# Patient Record
Sex: MALE | Race: WHITE | NOT HISPANIC OR LATINO | Employment: OTHER | URBAN - METROPOLITAN AREA
[De-identification: names, ages, dates, MRNs, and addresses within clinical notes are randomized per-mention and may not be internally consistent; named-entity substitution may affect disease eponyms.]

---

## 2021-01-20 ENCOUNTER — OFFICE VISIT (OUTPATIENT)
Dept: ENDOCRINOLOGY | Facility: CLINIC | Age: 61
End: 2021-01-20

## 2021-01-20 ENCOUNTER — TELEPHONE (OUTPATIENT)
Dept: ENDOCRINOLOGY | Facility: CLINIC | Age: 61
End: 2021-01-20

## 2021-01-20 VITALS
OXYGEN SATURATION: 96 % | WEIGHT: 233.2 LBS | BODY MASS INDEX: 32.65 KG/M2 | TEMPERATURE: 96.4 F | HEIGHT: 71 IN | HEART RATE: 73 BPM | RESPIRATION RATE: 16 BRPM | SYSTOLIC BLOOD PRESSURE: 124 MMHG | DIASTOLIC BLOOD PRESSURE: 80 MMHG

## 2021-01-20 DIAGNOSIS — E11.42 TYPE 2 DIABETES MELLITUS WITH DIABETIC POLYNEUROPATHY, WITHOUT LONG-TERM CURRENT USE OF INSULIN (HCC): ICD-10-CM

## 2021-01-20 DIAGNOSIS — IMO0002 DIABETES MELLITUS TYPE 2, UNCONTROLLED, WITH COMPLICATIONS: Primary | ICD-10-CM

## 2021-01-20 DIAGNOSIS — I10 ESSENTIAL HYPERTENSION: ICD-10-CM

## 2021-01-20 PROBLEM — E11.9 TYPE 2 DIABETES MELLITUS, WITHOUT LONG-TERM CURRENT USE OF INSULIN (HCC): Status: ACTIVE | Noted: 2021-01-20

## 2021-01-20 PROBLEM — E78.00 HYPERCHOLESTEREMIA: Status: ACTIVE | Noted: 2021-01-20

## 2021-01-20 LAB
EXPIRATION DATE: NORMAL
HBA1C MFR BLD: 6.9 %
Lab: NORMAL

## 2021-01-20 PROCEDURE — 83036 HEMOGLOBIN GLYCOSYLATED A1C: CPT | Performed by: INTERNAL MEDICINE

## 2021-01-20 PROCEDURE — 99204 OFFICE O/P NEW MOD 45 MIN: CPT | Performed by: INTERNAL MEDICINE

## 2021-01-20 RX ORDER — LISINOPRIL 10 MG/1
10 TABLET ORAL DAILY
Qty: 90 TABLET | Refills: 3 | Status: SHIPPED | OUTPATIENT
Start: 2021-01-20 | End: 2021-01-20

## 2021-01-20 RX ORDER — HYDROCHLOROTHIAZIDE 25 MG/1
25 TABLET ORAL DAILY
COMMUNITY
Start: 2020-08-31 | End: 2021-01-20 | Stop reason: SINTOL

## 2021-01-20 RX ORDER — LISINOPRIL 10 MG/1
10 TABLET ORAL DAILY
Qty: 90 TABLET | Refills: 3 | Status: SHIPPED | OUTPATIENT
Start: 2021-01-20 | End: 2021-11-23

## 2021-01-20 RX ORDER — GLIPIZIDE 10 MG/1
10 TABLET ORAL
COMMUNITY
End: 2021-01-20 | Stop reason: SDUPTHER

## 2021-01-20 RX ORDER — SERTRALINE HYDROCHLORIDE 100 MG/1
100 TABLET, FILM COATED ORAL DAILY
COMMUNITY
End: 2021-03-08 | Stop reason: SDUPTHER

## 2021-01-20 RX ORDER — LISINOPRIL 10 MG/1
10 TABLET ORAL DAILY
COMMUNITY
End: 2021-01-20 | Stop reason: SDUPTHER

## 2021-01-20 RX ORDER — GLIPIZIDE 10 MG/1
10 TABLET ORAL
Qty: 90 TABLET | Refills: 3 | Status: SHIPPED | OUTPATIENT
Start: 2021-01-20 | End: 2021-07-26

## 2021-01-20 NOTE — ASSESSMENT & PLAN NOTE
He is on a statin but does not recall which. His lipids are due to be checked in July. He will bring the medication in next time.

## 2021-01-20 NOTE — TELEPHONE ENCOUNTER
[facility] called regarding rx for lisinopril. They need clarification on quantity and instructions.

## 2021-03-08 RX ORDER — SERTRALINE HYDROCHLORIDE 100 MG/1
100 TABLET, FILM COATED ORAL DAILY
Qty: 90 TABLET | Refills: 1 | Status: SHIPPED | OUTPATIENT
Start: 2021-03-08 | End: 2021-08-30

## 2021-03-08 RX ORDER — SIMVASTATIN 20 MG
20 TABLET ORAL NIGHTLY
Qty: 90 TABLET | Refills: 1 | Status: SHIPPED | OUTPATIENT
Start: 2021-03-08 | End: 2021-08-30

## 2021-03-08 NOTE — TELEPHONE ENCOUNTER
Pt also needs a refill on simvastatin 20mg. Pt is completely out of medications. Please send to [facility] in [place].

## 2021-07-26 RX ORDER — GLIPIZIDE 10 MG/1
TABLET ORAL
Qty: 180 TABLET | Refills: 0 | Status: SHIPPED | OUTPATIENT
Start: 2021-07-26 | End: 2021-10-13

## 2021-08-30 RX ORDER — SIMVASTATIN 20 MG
20 TABLET ORAL NIGHTLY
Qty: 90 TABLET | Refills: 0 | Status: SHIPPED | OUTPATIENT
Start: 2021-08-30 | End: 2021-11-08

## 2021-08-30 RX ORDER — SERTRALINE HYDROCHLORIDE 100 MG/1
100 TABLET, FILM COATED ORAL DAILY
Qty: 90 TABLET | Refills: 0 | Status: SHIPPED | OUTPATIENT
Start: 2021-08-30 | End: 2021-11-10

## 2021-10-13 RX ORDER — GLIPIZIDE 10 MG/1
TABLET ORAL
Qty: 180 TABLET | Refills: 0 | Status: SHIPPED | OUTPATIENT
Start: 2021-10-13 | End: 2021-11-23 | Stop reason: SDUPTHER

## 2021-11-08 RX ORDER — SIMVASTATIN 20 MG
20 TABLET ORAL NIGHTLY
Qty: 90 TABLET | Refills: 0 | Status: SHIPPED | OUTPATIENT
Start: 2021-11-08 | End: 2021-11-23 | Stop reason: SDUPTHER

## 2021-11-10 RX ORDER — SERTRALINE HYDROCHLORIDE 100 MG/1
100 TABLET, FILM COATED ORAL DAILY
Qty: 90 TABLET | Refills: 0 | Status: SHIPPED | OUTPATIENT
Start: 2021-11-10 | End: 2021-11-23 | Stop reason: SDUPTHER

## 2021-11-23 ENCOUNTER — OFFICE VISIT (OUTPATIENT)
Dept: ENDOCRINOLOGY | Facility: CLINIC | Age: 61
End: 2021-11-23

## 2021-11-23 ENCOUNTER — LAB (OUTPATIENT)
Dept: LAB | Facility: HOSPITAL | Age: 61
End: 2021-11-23

## 2021-11-23 VITALS
BODY MASS INDEX: 32.34 KG/M2 | DIASTOLIC BLOOD PRESSURE: 84 MMHG | OXYGEN SATURATION: 96 % | HEART RATE: 71 BPM | SYSTOLIC BLOOD PRESSURE: 142 MMHG | HEIGHT: 71 IN | WEIGHT: 231 LBS

## 2021-11-23 DIAGNOSIS — E78.2 MIXED HYPERLIPIDEMIA: ICD-10-CM

## 2021-11-23 DIAGNOSIS — I10 ESSENTIAL HYPERTENSION: ICD-10-CM

## 2021-11-23 DIAGNOSIS — E11.65 TYPE 2 DIABETES MELLITUS WITH HYPERGLYCEMIA, WITHOUT LONG-TERM CURRENT USE OF INSULIN (HCC): Primary | ICD-10-CM

## 2021-11-23 DIAGNOSIS — E11.42 TYPE 2 DIABETES MELLITUS WITH DIABETIC POLYNEUROPATHY, WITHOUT LONG-TERM CURRENT USE OF INSULIN (HCC): ICD-10-CM

## 2021-11-23 LAB
EXPIRATION DATE: ABNORMAL
EXPIRATION DATE: NORMAL
GLUCOSE BLDC GLUCOMTR-MCNC: 175 MG/DL (ref 70–130)
HBA1C MFR BLD: 7.8 %
Lab: ABNORMAL
Lab: NORMAL

## 2021-11-23 PROCEDURE — 3051F HG A1C>EQUAL 7.0%<8.0%: CPT | Performed by: PHYSICIAN ASSISTANT

## 2021-11-23 PROCEDURE — 82947 ASSAY GLUCOSE BLOOD QUANT: CPT | Performed by: PHYSICIAN ASSISTANT

## 2021-11-23 PROCEDURE — 83036 HEMOGLOBIN GLYCOSYLATED A1C: CPT | Performed by: PHYSICIAN ASSISTANT

## 2021-11-23 PROCEDURE — 99214 OFFICE O/P EST MOD 30 MIN: CPT | Performed by: PHYSICIAN ASSISTANT

## 2021-11-23 RX ORDER — SERTRALINE HYDROCHLORIDE 100 MG/1
100 TABLET, FILM COATED ORAL DAILY
Qty: 90 TABLET | Refills: 1 | Status: SHIPPED | OUTPATIENT
Start: 2021-11-23 | End: 2022-10-20 | Stop reason: SDUPTHER

## 2021-11-23 RX ORDER — METFORMIN HYDROCHLORIDE 500 MG/1
2000 TABLET, EXTENDED RELEASE ORAL
Qty: 360 TABLET | Refills: 1 | Status: SHIPPED | OUTPATIENT
Start: 2021-11-23 | End: 2022-02-08

## 2021-11-23 RX ORDER — GLIPIZIDE 10 MG/1
10 TABLET ORAL
Qty: 180 TABLET | Refills: 1 | Status: SHIPPED | OUTPATIENT
Start: 2021-11-23 | End: 2022-10-20 | Stop reason: SDUPTHER

## 2021-11-23 RX ORDER — LISINOPRIL 20 MG/1
20 TABLET ORAL DAILY
Qty: 90 TABLET | Refills: 1 | Status: SHIPPED | OUTPATIENT
Start: 2021-11-23 | End: 2022-03-22

## 2021-11-23 RX ORDER — SIMVASTATIN 20 MG
20 TABLET ORAL NIGHTLY
Qty: 90 TABLET | Refills: 1 | Status: SHIPPED | OUTPATIENT
Start: 2021-11-23 | End: 2022-08-29 | Stop reason: SDUPTHER

## 2021-11-23 NOTE — PROGRESS NOTES
"     Office Note      Date: 2021  Patient Name: Matthieu Reeves  MRN: [id number]  : 1960    Chief Complaint   Patient presents with   • Diabetes       History of Present Illness:   Matthieu Reeves is a 61 y.o. male who presents for follow-up for type 2 diabetes.  It has been almost a year since his last appointment.  He reports life has been stressful at home.  His father was diagnosed with bladder and kidney cancer and things have been difficult.  He reports he has been off his Januvia since .  He reports he was no longer able to afford this.  He remains on glipizide 10 mg twice a day and increase his Metformin to 2000 mg daily when he stopped the Januvia.  He is not surprised his A1c is up some.  He denies any trouble with hypoglycemia.  He is up-to-date on his eye exam he went last week and they were supposed to send the reports to us.  He reports he has an appointment next month for a second opinion.  He has had both doses of his Covid vaccine but does not get flu vaccines.  He denies any trouble with his feet today.  He reports he is fasting to have labs checked today.      Subjective     Review of Systems:   Review of Systems   Constitutional: Negative.    Cardiovascular: Negative.    Gastrointestinal: Negative.    Endocrine: Negative.    Neurological: Negative.        The following portions of the patient's history were reviewed and updated as appropriate: allergies, current medications, past family history, past medical history, past social history, past surgical history and problem list.    Objective     Vitals:    21 1153   BP: 142/84   Pulse: 71   SpO2: 96%   Weight: 105 kg (231 lb)   Height: 180.3 cm (71\")   PainSc: 0-No pain     Body mass index is 32.22 kg/m².    Physical Exam  Vitals reviewed.   Constitutional:       General: He is not in acute distress.     Appearance: Normal appearance.   Cardiovascular:      Pulses:           Dorsalis pedis pulses are 2+ on the right side and 2+ " on the left side.        Posterior tibial pulses are 2+ on the right side and 2+ on the left side.   Musculoskeletal:      Right foot: No deformity.      Left foot: No deformity.   Feet:      Right foot:      Protective Sensation: 10 sites tested. 0 sites sensed.      Skin integrity: Callus and dry skin present.      Toenail Condition: Right toenails are normal.      Left foot:      Protective Sensation: 10 sites tested. 0 sites sensed.      Skin integrity: Callus and dry skin present.      Toenail Condition: Left toenails are normal.      Comments: Diabetic Foot Exam Performed and Monofilament Test Performed  Absent light touch sensation on the toes and forefoot.    Neurological:      Mental Status: He is alert.         HEMOGLOBIN A1C  Lab Results   Component Value Date    HGBA1C 7.8 11/23/2021       GLUCOSE  Glucose   Date Value Ref Range Status   11/23/2021 175 (A) 70 - 130 mg/dL Final           Assessment / Plan      Assessment & Plan:  1. Type 2 diabetes mellitus with hyperglycemia, without long-term current use of insulin (HCC)  His A1c today is higher than it was in January and above goal at 7.8%.  His blood glucose is elevated at 175 mg/dL fasting.  He has not been able to afford the Januvia in several months.  I discussed this with the pharmacist in the office and she will check into more affordable options for seeing if we can get the Januvia at a reduced price.  I gave him some Tradjenta samples to take until we find out what is going on with the Januvia.  He will also continue Metformin extended release 2000 mg daily and glipizide 10 mg twice a day.  We discussed considering pioglitazone as an option if we cannot find any affordable alternatives.  His weight is down 2 pounds since January.  We discussed the importance of healthy eating habits and physical activity as tolerated.  Fasting labs pending today.  Will send note with results and plan.  I refilled his diabetes medications today.  - POC  Glycosylated Hemoglobin (Hb A1C)  - POC Glucose, Blood  - TSH; Future  - Lipid Panel; Future  - Comprehensive Metabolic Panel; Future  - Microalbumin / Creatinine Urine Ratio - Urine, Clean Catch; Future  - Microalbumin / Creatinine Urine Ratio - Urine, Clean Catch  - Comprehensive Metabolic Panel  - Lipid Panel  - TSH    2. Type 2 diabetes mellitus with diabetic polyneuropathy, without long-term current use of insulin (HCC)  His A1c is above goal at 7.8%.  I did a foot exam today which showed some neuropathy.  We discussed the importance of monitoring feet regularly.    3. Essential hypertension  His blood pressure is elevated today.  He reports this has been running 130-140/80-90 at home.  We increased his lisinopril to 20 mg daily.  I sent a new prescription for this and advised him he could take lisinopril 10 mg 2 tablets daily until he gets the new prescription.    4. Mixed hyperlipidemia  Fasting labs pending.  Will send note with results and plan for now he will continue simvastatin 20 mg daily.  I refilled this prescription today.  Patient to call as needed.  - Lipid Panel; Future  - Lipid Panel      Return in about 3 months (around 2/23/2022) for Recheck 3-4 mos usually sees Dr. Holt.     Rose Patel PA-C  11/23/2021

## 2021-11-23 NOTE — PATIENT INSTRUCTIONS
Increase Lisinopril to 20mg daily--- okay to take two 10 mg tablets until you get your new prescription for 20mg tablets    We are going to check into options for Januvia.  Continue Metformin 2000mg/day

## 2021-11-24 LAB
ALBUMIN SERPL-MCNC: 4.8 G/DL (ref 3.5–5.2)
ALBUMIN/CREAT UR: 14 MG/G CREAT (ref 0–29)
ALBUMIN/GLOB SERPL: 2.4 G/DL
ALP SERPL-CCNC: 87 U/L (ref 39–117)
ALT SERPL-CCNC: 32 U/L (ref 1–41)
AST SERPL-CCNC: 23 U/L (ref 1–40)
BILIRUB SERPL-MCNC: 0.7 MG/DL (ref 0–1.2)
BUN SERPL-MCNC: 15 MG/DL (ref 6–20)
BUN/CREAT SERPL: 13.4 (ref 7–25)
CALCIUM SERPL-MCNC: 9.2 MG/DL (ref 8.6–10.5)
CHLORIDE SERPL-SCNC: 106 MMOL/L (ref 98–107)
CHOLEST SERPL-MCNC: 111 MG/DL (ref 0–200)
CO2 SERPL-SCNC: 25.9 MMOL/L (ref 22–29)
CREAT SERPL-MCNC: 1.12 MG/DL (ref 0.76–1.27)
CREAT UR-MCNC: 286.1 MG/DL
GLOBULIN SER CALC-MCNC: 2 GM/DL
GLUCOSE SERPL-MCNC: 161 MG/DL (ref 65–99)
HDLC SERPL-MCNC: 44 MG/DL (ref 40–60)
LDLC SERPL CALC-MCNC: 44 MG/DL (ref 0–100)
MICROALBUMIN UR-MCNC: 39.9 UG/ML
POTASSIUM SERPL-SCNC: 4.5 MMOL/L (ref 3.5–5.2)
PROT SERPL-MCNC: 6.8 G/DL (ref 6–8.5)
SODIUM SERPL-SCNC: 141 MMOL/L (ref 136–145)
TRIGL SERPL-MCNC: 134 MG/DL (ref 0–150)
TSH SERPL DL<=0.005 MIU/L-ACNC: 1.41 UIU/ML (ref 0.27–4.2)
VLDLC SERPL CALC-MCNC: 23 MG/DL (ref 5–40)

## 2021-11-24 NOTE — TELEPHONE ENCOUNTER
Spoke to Mr. Reeves via telephone this afternoon regarding cost of Januvia. He states his current copay is $75 for a 90-day supply, which is too expensive for him to pay all at once. States that he could more easily afford medication if it were filled every 30 days for $25 instead, but would prefer a more cost-effective alternative if possible. Discussed with pt Alogliptin (Nesina) is in the same drug class as Januvia but is covered under his [facility] [employer] plan for $0. Pt endorses preference towards alogliptin given significant pérez difference. Per Rose Patel PA-C okay to switch from Januvia to Alogliptin. Patient's Scr 1.12 (11/23/21), est CrCl 87.6 ml/min - no dose adjustments required. Advised Mr. Reeves his dose would be 25mg orally once daily and can be taken with or without food. Discussed that mechanism of action and ADEs are similar between alogliptin and Januvia. Pt denied any questions or concerns at this time. Patient currently has 2 weeks of Tradjenta samples that he will complete prior to starting Alogliptin. Confirmed pt's [facility] pharmacy.     Susan Rodríguez, PharmD  11/24/2021  14:10 EST

## 2021-11-29 RX ORDER — ALOGLIPTIN 25 MG/1
25 TABLET, FILM COATED ORAL DAILY
Qty: 90 TABLET | Refills: 1 | Status: SHIPPED | OUTPATIENT
Start: 2021-11-29 | End: 2022-05-26

## 2022-02-08 RX ORDER — LISINOPRIL 10 MG/1
TABLET ORAL
Qty: 90 TABLET | Refills: 3 | OUTPATIENT
Start: 2022-02-08

## 2022-03-22 ENCOUNTER — OFFICE VISIT (OUTPATIENT)
Dept: ENDOCRINOLOGY | Facility: CLINIC | Age: 62
End: 2022-03-22

## 2022-03-22 VITALS
SYSTOLIC BLOOD PRESSURE: 144 MMHG | HEIGHT: 71 IN | HEART RATE: 68 BPM | WEIGHT: 236 LBS | BODY MASS INDEX: 33.04 KG/M2 | DIASTOLIC BLOOD PRESSURE: 80 MMHG | OXYGEN SATURATION: 97 %

## 2022-03-22 DIAGNOSIS — E78.00 HYPERCHOLESTEREMIA: ICD-10-CM

## 2022-03-22 DIAGNOSIS — E11.65 TYPE 2 DIABETES MELLITUS WITH HYPERGLYCEMIA, WITHOUT LONG-TERM CURRENT USE OF INSULIN: Primary | ICD-10-CM

## 2022-03-22 DIAGNOSIS — I10 ESSENTIAL HYPERTENSION: ICD-10-CM

## 2022-03-22 LAB
EXPIRATION DATE: ABNORMAL
EXPIRATION DATE: NORMAL
GLUCOSE BLDC GLUCOMTR-MCNC: 144 MG/DL (ref 70–130)
HBA1C MFR BLD: 6.7 %
Lab: ABNORMAL
Lab: NORMAL

## 2022-03-22 PROCEDURE — 82947 ASSAY GLUCOSE BLOOD QUANT: CPT | Performed by: PHYSICIAN ASSISTANT

## 2022-03-22 PROCEDURE — 3044F HG A1C LEVEL LT 7.0%: CPT | Performed by: PHYSICIAN ASSISTANT

## 2022-03-22 PROCEDURE — 99214 OFFICE O/P EST MOD 30 MIN: CPT | Performed by: PHYSICIAN ASSISTANT

## 2022-03-22 PROCEDURE — 83036 HEMOGLOBIN GLYCOSYLATED A1C: CPT | Performed by: PHYSICIAN ASSISTANT

## 2022-03-22 RX ORDER — LISINOPRIL 10 MG/1
15 TABLET ORAL DAILY
Qty: 135 TABLET | Refills: 1 | Status: SHIPPED | OUTPATIENT
Start: 2022-03-22 | End: 2022-07-01 | Stop reason: SDUPTHER

## 2022-03-22 NOTE — PROGRESS NOTES
"     Office Note      Date: 2022  Patient Name: Matthieu Reeves  MRN: [id number]  : 1960    Chief Complaint   Patient presents with   • Diabetes     Type II       History of Present Illness:   Matthieu Reeves is a 61 y.o. male who presents for follow-up for type 2 diabetes.  He reports the alogliptin 25 mg daily has been much more affordable and he is taking this regularly.  He continues Metformin extended release 1500 mg daily and glipizide 10 mg twice a day.  He denies any trouble with hypoglycemia.  He reports he increase his lisinopril to 20 mg daily but felt very tired so he has been taking 15 mg but is out of this medication currently.  He reports his blood pressure readings at home have been 130-140/80.  He is up-to-date on his eye exam he went in November and needs to have cataract surgery he has an appointment in May for follow-up.  He denies any trouble with his feet today we did his foot exam as well as fasting labs at his appointment in November.      Subjective     Review of Systems:   Review of Systems   Constitutional: Negative.    Cardiovascular: Negative.    Gastrointestinal: Negative.    Endocrine: Negative.    Neurological: Negative.    Psychiatric/Behavioral: Positive for sleep disturbance.       The following portions of the patient's history were reviewed and updated as appropriate: allergies, current medications, past family history, past medical history, past social history, past surgical history and problem list.    Objective     Vitals:    22 1052   BP: 144/80   BP Location: Left arm   Patient Position: Sitting   Cuff Size: Adult   Pulse: 68   SpO2: 97%   Weight: 107 kg (236 lb)   Height: 180.3 cm (71\")   PainSc: 0-No pain     Body mass index is 32.92 kg/m².    Physical Exam    HEMOGLOBIN A1C  Lab Results   Component Value Date    HGBA1C 6.7 2022       GLUCOSE  Glucose   Date Value Ref Range Status   2022 144 (A) 70 - 130 mg/dL Final     Comment:     Patient " is fasting       CMP  Lab Results   Component Value Date    GLUCOSE 161 (H) 11/23/2021    BUN 15 11/23/2021    CREATININE 1.12 11/23/2021    EGFRIFNONA 67 11/23/2021    EGFRIFAFRI 81 11/23/2021    BCR 13.4 11/23/2021    K 4.5 11/23/2021    CO2 25.9 11/23/2021    CALCIUM 9.2 11/23/2021    PROTENTOTREF 6.8 11/23/2021    LABIL2 2.4 11/23/2021    AST 23 11/23/2021    ALT 32 11/23/2021       LIPID PANEL  Lab Results   Component Value Date    CHLPL 111 11/23/2021    TRIG 134 11/23/2021    HDL 44 11/23/2021    LDL 44 11/23/2021       URINE MICROALBUMIN/CREATININE RATIO  Microalbumin/Creatinine Ratio   Date Value Ref Range Status   11/23/2021 14 0 - 29 mg/g creat Final     Comment:                            Normal:                0 -  29                         Moderately increased: 30 - 300                         Severely increased:       >300         TSH  Lab Results   Component Value Date    TSH 1.410 11/23/2021       Assessment / Plan      Assessment & Plan:  1. Type 2 diabetes mellitus with hyperglycemia, without long-term current use of insulin (HCC)  His A1c today has improved significantly at 6.7%.  For now he will continue glipizide 10 mg twice a day, Metformin extended release 1500 mg daily and alogliptin 25 mg daily.  He was concerned alogliptin may be causing trouble sleeping.  We discussed limiting his caffeine intake before bed.  He reports he often drinks Mountain Dew 0 at this time.  His weight is up 5 pounds since his appointment in November.  I encouraged healthy eating habits and physical activity as tolerated.  - POC Glucose, Blood  - POC Glycosylated Hemoglobin (Hb A1C)    2. Essential hypertension  His blood pressure is up some today.  He reports he has been out of lisinopril for about a week.  I sent in a prescription for lisinopril 10 mg 1-1/2 tablets daily.  He will monitor his blood pressure at home and bring in readings next appointment.  We will plan to check a BMP on this dose of  lisinopril.    3. Hypercholesteremia  His fasting labs were to goal last time.  He will continue his simvastatin 20 mg daily.      Return in about 3 months (around 6/22/2022) for Recheck 3-4 mos.     This note was dictated using Dragon voice recognition.    Rose Patel PA-C  03/22/2022

## 2022-05-25 ENCOUNTER — PRIOR AUTHORIZATION (OUTPATIENT)
Dept: ENDOCRINOLOGY | Facility: CLINIC | Age: 62
End: 2022-05-25

## 2022-05-25 NOTE — TELEPHONE ENCOUNTER
Matthieu Lala Davalos: [id number] - PA Case ID: [id number] - Rx #: [id number] help? Call us at [phone]  Outcome  Deniedon May 24  The drug you asked for is non-formulary (not on [facility] list of preferred drugs). Before the drug can be covered, we need more information. Please ask your prescriber to explain to [facility] why the preferred drugs have not worked for your medical condition and/or would have bad side effects. If you have not tried the preferred drugs, including Januvia tablet, and Tradjenta tablet, please talk to your health care provider about prescribing one of these for you. Some preferred drugs may require an additional review and approval by [facility]. Additionally, some alternative drugs listed may be the same drugs with different strengths or forms. Under certain cases, [facility] may only require trial and failure of one strength or form of that drug. This determination was based on the [facility] Non-Formulary Exceptions Coverage Policy.  Drug  Alogliptin Benzoate 25MG tablets  Form  [employer] Electronic PA Form  Original Claim Info  [id number],MR      Health Plan’s Preferred Products  TRADJENTA [id number]  JANUVIA [id number]

## 2022-05-26 NOTE — TELEPHONE ENCOUNTER
Please let him know it appears Januvia if preferred by his insurance again.  I have sent ain a prescription for Januvia 100mg daily to replace Aloglipitin once he finishes his current supply.  Thanks RT

## 2022-07-01 RX ORDER — LISINOPRIL 10 MG/1
15 TABLET ORAL DAILY
Qty: 135 TABLET | Refills: 1 | Status: SHIPPED | OUTPATIENT
Start: 2022-07-01 | End: 2022-10-20 | Stop reason: SDUPTHER

## 2022-08-29 RX ORDER — SIMVASTATIN 20 MG
20 TABLET ORAL NIGHTLY
Qty: 90 TABLET | Refills: 1 | Status: SHIPPED | OUTPATIENT
Start: 2022-08-29 | End: 2022-10-20 | Stop reason: SDUPTHER

## 2022-10-20 ENCOUNTER — OFFICE VISIT (OUTPATIENT)
Dept: ENDOCRINOLOGY | Facility: CLINIC | Age: 62
End: 2022-10-20

## 2022-10-20 VITALS
HEART RATE: 72 BPM | HEIGHT: 71 IN | WEIGHT: 232 LBS | DIASTOLIC BLOOD PRESSURE: 80 MMHG | OXYGEN SATURATION: 98 % | SYSTOLIC BLOOD PRESSURE: 132 MMHG | BODY MASS INDEX: 32.48 KG/M2

## 2022-10-20 DIAGNOSIS — I10 ESSENTIAL HYPERTENSION: ICD-10-CM

## 2022-10-20 DIAGNOSIS — E11.65 TYPE 2 DIABETES MELLITUS WITH HYPERGLYCEMIA, WITHOUT LONG-TERM CURRENT USE OF INSULIN: Primary | ICD-10-CM

## 2022-10-20 DIAGNOSIS — E11.42 TYPE 2 DIABETES MELLITUS WITH DIABETIC POLYNEUROPATHY, WITHOUT LONG-TERM CURRENT USE OF INSULIN: ICD-10-CM

## 2022-10-20 LAB
EXPIRATION DATE: ABNORMAL
EXPIRATION DATE: NORMAL
GLUCOSE BLDC GLUCOMTR-MCNC: 138 MG/DL (ref 70–130)
HBA1C MFR BLD: 7.3 %
Lab: ABNORMAL
Lab: NORMAL

## 2022-10-20 PROCEDURE — 83036 HEMOGLOBIN GLYCOSYLATED A1C: CPT | Performed by: PHYSICIAN ASSISTANT

## 2022-10-20 PROCEDURE — 3051F HG A1C>EQUAL 7.0%<8.0%: CPT | Performed by: PHYSICIAN ASSISTANT

## 2022-10-20 PROCEDURE — 82947 ASSAY GLUCOSE BLOOD QUANT: CPT | Performed by: PHYSICIAN ASSISTANT

## 2022-10-20 PROCEDURE — 99214 OFFICE O/P EST MOD 30 MIN: CPT | Performed by: PHYSICIAN ASSISTANT

## 2022-10-20 RX ORDER — SIMVASTATIN 20 MG
20 TABLET ORAL NIGHTLY
Qty: 90 TABLET | Refills: 1 | Status: SHIPPED | OUTPATIENT
Start: 2022-10-20

## 2022-10-20 RX ORDER — SERTRALINE HYDROCHLORIDE 100 MG/1
100 TABLET, FILM COATED ORAL DAILY
Qty: 90 TABLET | Refills: 1 | Status: SHIPPED | OUTPATIENT
Start: 2022-10-20

## 2022-10-20 RX ORDER — GLIPIZIDE 10 MG/1
10 TABLET ORAL
Qty: 180 TABLET | Refills: 1 | Status: SHIPPED | OUTPATIENT
Start: 2022-10-20

## 2022-10-20 RX ORDER — LISINOPRIL 10 MG/1
15 TABLET ORAL DAILY
Qty: 135 TABLET | Refills: 1 | Status: SHIPPED | OUTPATIENT
Start: 2022-10-20 | End: 2023-10-20

## 2022-10-20 RX ORDER — METFORMIN HYDROCHLORIDE 500 MG/1
2000 TABLET, EXTENDED RELEASE ORAL
Qty: 360 TABLET | Refills: 2 | Status: SHIPPED | OUTPATIENT
Start: 2022-10-20

## 2022-10-20 NOTE — PROGRESS NOTES
"     Office Note      Date: 10/20/2022  Patient Name: Matthieu Reeves  MRN: [id number]  : 1960    Chief Complaint   Patient presents with   • Diabetes       History of Present Illness:   Matthieu Reeves is a 62 y.o. male who presents for follow-up for type 2 diabetes.  He is no longer on alogliptin or Januvia because he cannot afford the $75.  He reports his blood sugars were good until he got switched to regular metformin.  He reports he has had trouble with diarrhea on this and is asking about going back to the extended release.  He remains on glipizide 10 mg twice a day.  He denies any trouble with hypoglycemia.  He reports he is up-to-date on his eye exam he had cataract surgery in August and September and all went well.  Continues to smoke a pack a day of cigarettes but has cut back from a pack and 1/2 to 2 packs over the last year.  He denies any trouble with his feet today.  He is not fasting and continues to take simvastatin 20 mg regularly.      Subjective     Review of Systems:   Review of Systems   Constitutional: Negative.    Cardiovascular: Negative.    Gastrointestinal: Negative.    Endocrine: Negative.    Neurological: Negative.        The following portions of the patient's history were reviewed and updated as appropriate: allergies, current medications, past family history, past medical history, past social history, past surgical history and problem list.    Objective     Vitals:    10/20/22 1333   BP: 132/80   Pulse: 72   SpO2: 98%   Weight: 105 kg (232 lb)   Height: 180.3 cm (71\")     Body mass index is 32.36 kg/m².    Physical Exam  Vitals reviewed.   Constitutional:       General: He is not in acute distress.     Appearance: Normal appearance.   Cardiovascular:      Pulses:           Dorsalis pedis pulses are 2+ on the right side and 2+ on the left side.        Posterior tibial pulses are 2+ on the right side and 2+ on the left side.   Musculoskeletal:      Right foot: No deformity.     "  Left foot: No deformity.   Feet:      Right foot:      Protective Sensation: 10 sites tested. 5 sites sensed.      Toenail Condition: Right toenails are normal.      Left foot:      Protective Sensation: 10 sites tested. 5 sites sensed.      Skin integrity: Skin integrity normal.      Toenail Condition: Left toenails are normal.      Comments: Diabetic Foot Exam Performed and Monofilament Test Performed    Neurological:      Mental Status: He is alert.         HEMOGLOBIN A1C  Lab Results   Component Value Date    HGBA1C 7.3 10/20/2022       GLUCOSE  Glucose   Date Value Ref Range Status   10/20/2022 138 (A) 70 - 130 mg/dL Final       CMP  Lab Results   Component Value Date    GLUCOSE 161 (H) 11/23/2021    BUN 15 11/23/2021    CREATININE 1.12 11/23/2021    EGFRIFNONA 67 11/23/2021    EGFRIFAFRI 81 11/23/2021    BCR 13.4 11/23/2021    K 4.5 11/23/2021    CO2 25.9 11/23/2021    CALCIUM 9.2 11/23/2021    PROTENTOTREF 6.8 11/23/2021    LABIL2 2.4 11/23/2021    AST 23 11/23/2021    ALT 32 11/23/2021       LIPID PANEL  Lab Results   Component Value Date    CHLPL 111 11/23/2021    TRIG 134 11/23/2021    HDL 44 11/23/2021    LDL 44 11/23/2021       URINE MICROALBUMIN/CREATININE RATIO  Microalbumin/Creatinine Ratio   Date Value Ref Range Status   11/23/2021 14 0 - 29 mg/g creat Final     Comment:                            Normal:                0 -  29                         Moderately increased: 30 - 300                         Severely increased:       >300         TSH  Lab Results   Component Value Date    TSH 1.410 11/23/2021       Assessment / Plan      Assessment & Plan:  1. Type 2 diabetes mellitus with hyperglycemia, without long-term current use of insulin (HCC)  His hemoglobin A1c is up as compared to March at 7.3%.  This is above goal.  We will switch him back to metformin extended release 2000 mg daily and he will continue glipizide 10 mg twice a day.  I sent prescriptions for these to his pharmacy.  I refilled  his simvastatin.  His weight is down 4 pounds since his appointment in March.  I encouraged healthy eating habits and physical activity as tolerated.  His foot exam was okay today but does show neuropathy.  He is due for fasting labs we will plan to do these next visit.  - POC Glucose, Blood  - POC Glycosylated Hemoglobin (Hb A1C)    2. Type 2 diabetes mellitus with diabetic polyneuropathy, without long-term current use of insulin (HCC)  His foot exam shows neuropathy today we discussed smoking cessation but he reports he is not ready to do this at this time.    3. Essential hypertension  His blood pressure is okay today and better than last visit.  He will continue lisinopril 15 mg daily.  I refilled this prescription today.      Return in about 5 months (around 3/20/2023) for Recheck 5 mos, fasting.     This note was dictated using Dragon voice recognition.    Rose Patel PA-C  10/20/2022

## 2024-04-03 NOTE — TELEPHONE ENCOUNTER
Last OV 7/21/21 Future appt 9/8/21   alonso: h/h 6.0. low iron level. hemodynamically stable. 1 unit prbc ordered

## 2024-12-27 ENCOUNTER — TELEPHONE (OUTPATIENT)
Dept: ENDOCRINOLOGY | Facility: CLINIC | Age: 64
End: 2024-12-27
Payer: MEDICARE

## 2024-12-27 NOTE — TELEPHONE ENCOUNTER
Caller: BIBI WAY    Relationship: Emergency Contact    Best call back number: [phone]    What is the best time to reach you: ANYTIME    Who are you requesting to speak with (clinical staff, provider,  specific staff member): CLINICAL STAFF / PROVIDER     What was the call regarding: PT SPOUSE CALLED WANTING PROVIDER TO KNOW PT IS GOING TO HAVE SURGERY AND TO NOT WORRY ABOUT MAKING A APPT AS FOR RIGHT NOW. PLEASE ADVISE.

## 2024-12-27 NOTE — TELEPHONE ENCOUNTER
Returned spouse call. LVM if they need to call and schedule to just call us back. Per message looks like they called back to let us know they no longer need to schedule.

## 2024-12-27 NOTE — TELEPHONE ENCOUNTER
Caller: BIBI WAY    Relationship: Emergency Contact    Best call back number: [phone]    What is the best time to reach you: ANYTIME    Who are you requesting to speak with (clinical staff, provider,  specific staff member): CLINICAL STAFF / PROVIDER    What was the call regarding: PT SPOUSE CALLED STATING PT NECK HAS BEEN HURTING CAUSING HIS HANDS AND LEGS TO GO NUMB. PT HAS HAD A MRI ON NECK AND SPINE AND IS WAITING FOR RESULTS. PT SPOUSE UNSURE ON WHAT TO DO FOR PT. PLEASE ADVISE.